# Patient Record
Sex: MALE | Race: BLACK OR AFRICAN AMERICAN | NOT HISPANIC OR LATINO
[De-identification: names, ages, dates, MRNs, and addresses within clinical notes are randomized per-mention and may not be internally consistent; named-entity substitution may affect disease eponyms.]

---

## 2021-12-13 PROBLEM — Z00.00 ENCOUNTER FOR PREVENTIVE HEALTH EXAMINATION: Status: ACTIVE | Noted: 2021-12-13

## 2021-12-16 ENCOUNTER — APPOINTMENT (OUTPATIENT)
Dept: ORTHOPEDIC SURGERY | Facility: CLINIC | Age: 36
End: 2021-12-16
Payer: COMMERCIAL

## 2021-12-16 ENCOUNTER — NON-APPOINTMENT (OUTPATIENT)
Age: 36
End: 2021-12-16

## 2021-12-16 VITALS — HEIGHT: 71 IN | BODY MASS INDEX: 32.9 KG/M2 | WEIGHT: 235 LBS

## 2021-12-16 DIAGNOSIS — S62.616A DISPLACED FRACTURE OF PROXIMAL PHALANX OF RIGHT LITTLE FINGER, INITIAL ENCOUNTER FOR CLOSED FRACTURE: ICD-10-CM

## 2021-12-16 PROCEDURE — 73140 X-RAY EXAM OF FINGER(S): CPT | Mod: F9

## 2021-12-16 PROCEDURE — 29125 APPL SHORT ARM SPLINT STATIC: CPT | Mod: RT

## 2021-12-16 PROCEDURE — 99203 OFFICE O/P NEW LOW 30 MIN: CPT | Mod: 25
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
## 2021-12-16 NOTE — ADDENDUM
[FreeTextEntry1] : I, Velia Resendiz wrote this note acting as a scribe for Dr. Asim Will on Dec 16, 2021.

## 2021-12-16 NOTE — PHYSICAL EXAM
[de-identified] : Patient is WDWN, alert, and in no acute distress. Breathing is unlabored. He is grossly oriented to person, place, and time.\par \par He is accompanied by his wife today.\par \par Right Hand: Little Finger:\par Patient presents in an alumifoam splint which was removed for physical exam.\par No deformity or malrotation of the little finger\par Limited ROM at the MCP joint due to injury and pain as well as immobilization\par Edema present over the MCP joint\par FROM of all other digits\par Sensation is normal to all digits. [de-identified] : AP, lateral and oblique views of the right hand (little finger) were obtained today and revealed a fracture to the proximal phalanx of the right little finger.

## 2021-12-16 NOTE — DISCUSSION/SUMMARY
[FreeTextEntry1] : The underlying pathophysiology was reviewed with the patient. XR films were reviewed with the patient. Discussed at length the nature of the patient’s condition. The right little finger symptoms appear secondary to proximal phalanx fracture.\par \par At this time, he was advised to discontinue use of the alumifoam splint.\par We did discuss options of surgical management with pinning versus nonoperative treatment. At this time, I would recommend nonoperative treatment of the fracture.\par The little finger was kathy taped to the ring finger. He was advised to change the tape daily. He was additionally placed in a fiber glass splint for additional protection that he was advised to solely wear when outdoors but to remove when indoors. \par I reassured him that the swelling will reduce with time and that he should be pumping the hand and actively as well as passively working on ROM of the little finger at all joint levels. \par He was instructed to soak the hand in warm water and Epsom salts.\par \par All questions answered, understanding verbalized. Patient in agreement with plan of care. Follow up in 2 weeks for repeat xrays and reassessment.

## 2021-12-30 ENCOUNTER — APPOINTMENT (OUTPATIENT)
Dept: ORTHOPEDIC SURGERY | Facility: CLINIC | Age: 36
End: 2021-12-30
Payer: COMMERCIAL

## 2022-01-10 ENCOUNTER — APPOINTMENT (OUTPATIENT)
Dept: ORTHOPEDIC SURGERY | Facility: CLINIC | Age: 37
End: 2022-01-10
Payer: COMMERCIAL

## 2022-01-10 VITALS — WEIGHT: 235 LBS | BODY MASS INDEX: 32.9 KG/M2 | HEIGHT: 71 IN

## 2022-01-10 PROCEDURE — 99213 OFFICE O/P EST LOW 20 MIN: CPT

## 2022-01-10 PROCEDURE — 73140 X-RAY EXAM OF FINGER(S): CPT | Mod: F9

## 2022-01-10 NOTE — ADDENDUM
[FreeTextEntry1] : I, Velia Resendiz wrote this note acting as a scribe for Dr. Asim Will on Rod 10, 2022.

## 2022-01-10 NOTE — END OF VISIT
[FreeTextEntry3] : All medical record entries made by the Scribe were at my,  Dr. Asim Will MD., direction and personally dictated by me on 01/10/2022. I have personally reviewed the chart and agree that the record accurately reflects my personal performance of the history, physical exam, assessment and plan.

## 2022-01-10 NOTE — DISCUSSION/SUMMARY
[FreeTextEntry1] : The underlying pathophysiology was reviewed with the patient. XR films were reviewed with the patient. Discussed at length the nature of the patient’s condition. The right little finger symptoms are secondary to proximal phalanx fracture.\par \par He was instructed on continue range of motion exercises of the right little finger both actively and passively. He was encouraged to soak the hand in warm water and Epsom salts to aide in alleviating the stiffness to the finger and help to improve ROM when performing ROM exercises. \par I would recommend he continue with kathy taping of the little finger to the neighboring digit and use the ring finger in helping to move and stretch the little finger. Change the tape once daily.\par Finally, during today's visit, the little finger was wrapped into forced flexion with Coban to promote further flexion of the digit and flexion of the joint capsule. \par \par All questions answered, understanding verbalized. Patient in agreement with plan of care. Follow up in 3 weeks for repeat xrays and reassessment.
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
## 2022-01-10 NOTE — PHYSICAL EXAM
[de-identified] : Patient is WDWN, alert, and in no acute distress. Breathing is unlabored. He is grossly oriented to person, place, and time.\par \par Right Hand: Little Finger:\par No deformity or malrotation of the little finger\par Limited ROM at the MCP joint due to injury and pain as well as immobilization\par Edema present over the MCP joint\par FROM of all other digits\par Sensation is normal to all digits.  [de-identified] : AP, lateral and oblique views of the right hand (little finger) were obtained today and revealed a fracture to the proximal phalanx of the right little finger. There is partial bony healing noted.

## 2022-01-31 ENCOUNTER — APPOINTMENT (OUTPATIENT)
Dept: ORTHOPEDIC SURGERY | Facility: CLINIC | Age: 37
End: 2022-01-31
Payer: COMMERCIAL

## 2022-01-31 ENCOUNTER — TRANSCRIPTION ENCOUNTER (OUTPATIENT)
Age: 37
End: 2022-01-31

## 2022-01-31 PROCEDURE — 73140 X-RAY EXAM OF FINGER(S): CPT | Mod: F9

## 2022-01-31 PROCEDURE — 99213 OFFICE O/P EST LOW 20 MIN: CPT

## 2022-01-31 NOTE — ADDENDUM
[FreeTextEntry1] : I, Velia Resendiz wrote this note acting as a scribe for Dr. Asim Will on Jan 31, 2022.
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
## 2022-01-31 NOTE — END OF VISIT
[FreeTextEntry3] : All medical record entries made by the Scribe were at my,  Dr. Asim Will MD., direction and personally dictated by me on 01/31/2022. I have personally reviewed the chart and agree that the record accurately reflects my personal performance of the history, physical exam, assessment and plan.

## 2022-01-31 NOTE — PHYSICAL EXAM
[de-identified] : Patient is WDWN, alert, and in no acute distress. Breathing is unlabored. He is grossly oriented to person, place, and time.\par \par Right Hand: Little Finger:\par No deformity or malrotation of the little finger\par Limited ROM at the MCP joint due to injury and pain as well as immobilization\par No tenderness to palpation to the MCP joint.\par Edema present over the MCP joint\par FROM of all other digits\par Sensation is normal to all digits.  [de-identified] : AP, lateral and oblique views of the right hand (little finger) were obtained today and revealed a fracture to the proximal phalanx of the right little finger. Interval healing taking place.

## 2022-01-31 NOTE — DISCUSSION/SUMMARY
[FreeTextEntry1] : The underlying pathophysiology was reviewed with the patient. XR films were reviewed with the patient. Discussed at length the nature of the patient’s condition. The right little finger symptoms are secondary to proximal phalanx fracture.\par \par He was instructed on continue range of motion exercises of the right little finger both actively and passively. He was encouraged to soak the hand in warm water and Epsom salts to aide in alleviating the stiffness to the finger and help to improve ROM when performing ROM exercises. \par Given his overall improvement, I do not feel hand therapy is necessary. Patient is in agreement and has agreed to continue with at home exercises.\par \par All questions answered, understanding verbalized. Patient in agreement with plan of care. Follow up in 3 months for repeat xrays.

## 2022-05-02 ENCOUNTER — APPOINTMENT (OUTPATIENT)
Dept: ORTHOPEDIC SURGERY | Facility: CLINIC | Age: 37
End: 2022-05-02
Payer: COMMERCIAL

## 2022-05-02 DIAGNOSIS — S62.616D DISPLACED FRACTURE OF PROXIMAL PHALANX OF RIGHT LITTLE FINGER, SUBSEQUENT ENCOUNTER FOR FRACTURE WITH ROUTINE HEALING: ICD-10-CM

## 2022-05-02 PROCEDURE — 73140 X-RAY EXAM OF FINGER(S): CPT | Mod: F9

## 2022-05-02 PROCEDURE — 99213 OFFICE O/P EST LOW 20 MIN: CPT

## 2022-05-03 NOTE — DISCUSSION/SUMMARY
[FreeTextEntry1] : The underlying pathophysiology was reviewed with the patient. XR films were reviewed with the patient. Discussed at length the nature of the patient’s condition. The right little finger symptoms are secondary to proximal phalanx fracture.\par \par He will advance his activities as tolerated without restrictions at this time. \par \par All questions answered, understanding verbalized. Patient in agreement with plan of care. Follow up as needed.

## 2022-05-03 NOTE — ADDENDUM
[FreeTextEntry1] : I, Velia Resendiz wrote this note acting as a scribe for Dr. Asim Will on May 02, 2022.

## 2022-05-03 NOTE — END OF VISIT
[FreeTextEntry3] : All medical record entries made by the Scribe were at my,  Dr. Asim Will MD., direction and personally dictated by me on 05/02/2022. I have personally reviewed the chart and agree that the record accurately reflects my personal performance of the history, physical exam, assessment and plan.

## 2022-05-03 NOTE — PHYSICAL EXAM
[de-identified] : Patient is WDWN, alert, and in no acute distress. Breathing is unlabored. He is grossly oriented to person, place, and time.\par \par Right Hand: Little Finger:\par No deformity or malrotation of the little finger\par ROM at the MCP joint is full\par No tenderness to palpation to the MCP joint.\par MIld edema present over the MCP joint\par FROM of all other digits\par Sensation is normal to all digits. \par  [de-identified] : AP, lateral and oblique views of the right hand (little finger) were obtained today and revealed a fracture to the proximal phalanx of the right little finger. Fracture is healed.
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
## 2025-07-24 ENCOUNTER — APPOINTMENT (OUTPATIENT)
Dept: CARDIOLOGY | Facility: CLINIC | Age: 40
End: 2025-07-24

## 2025-08-07 ENCOUNTER — NON-APPOINTMENT (OUTPATIENT)
Age: 40
End: 2025-08-07

## 2025-08-07 ENCOUNTER — APPOINTMENT (OUTPATIENT)
Dept: CARDIOLOGY | Facility: CLINIC | Age: 40
End: 2025-08-07
Payer: COMMERCIAL

## 2025-08-07 VITALS
DIASTOLIC BLOOD PRESSURE: 70 MMHG | SYSTOLIC BLOOD PRESSURE: 130 MMHG | WEIGHT: 248 LBS | BODY MASS INDEX: 34.59 KG/M2 | OXYGEN SATURATION: 96 % | HEART RATE: 70 BPM

## 2025-08-07 DIAGNOSIS — Z00.00 ENCOUNTER FOR GENERAL ADULT MEDICAL EXAMINATION W/OUT ABNORMAL FINDINGS: ICD-10-CM

## 2025-08-07 DIAGNOSIS — I10 ESSENTIAL (PRIMARY) HYPERTENSION: ICD-10-CM

## 2025-08-07 DIAGNOSIS — R01.1 CARDIAC MURMUR, UNSPECIFIED: ICD-10-CM

## 2025-08-07 DIAGNOSIS — E78.5 HYPERLIPIDEMIA, UNSPECIFIED: ICD-10-CM

## 2025-08-07 DIAGNOSIS — E66.01 MORBID (SEVERE) OBESITY DUE TO EXCESS CALORIES: ICD-10-CM

## 2025-08-07 DIAGNOSIS — R73.03 PREDIABETES.: ICD-10-CM

## 2025-08-07 PROCEDURE — 99204 OFFICE O/P NEW MOD 45 MIN: CPT | Mod: 25

## 2025-08-07 PROCEDURE — 99401 PREV MED CNSL INDIV APPRX 15: CPT

## 2025-08-07 PROCEDURE — G0537: CPT

## 2025-08-07 PROCEDURE — 93000 ELECTROCARDIOGRAM COMPLETE: CPT

## 2025-08-12 RX ORDER — SEMAGLUTIDE 0.25 MG/.5ML
0.25 INJECTION, SOLUTION SUBCUTANEOUS
Qty: 4 | Refills: 0 | Status: ACTIVE | COMMUNITY
Start: 2025-08-12 | End: 1900-01-01

## 2025-09-08 ENCOUNTER — APPOINTMENT (OUTPATIENT)
Dept: CARDIOLOGY | Facility: CLINIC | Age: 40
End: 2025-09-08
Payer: COMMERCIAL

## 2025-09-08 VITALS — BODY MASS INDEX: 33.47 KG/M2 | WEIGHT: 240 LBS

## 2025-09-08 DIAGNOSIS — R73.03 PREDIABETES.: ICD-10-CM

## 2025-09-08 DIAGNOSIS — I10 ESSENTIAL (PRIMARY) HYPERTENSION: ICD-10-CM

## 2025-09-08 DIAGNOSIS — E78.5 HYPERLIPIDEMIA, UNSPECIFIED: ICD-10-CM

## 2025-09-08 PROCEDURE — 99214 OFFICE O/P EST MOD 30 MIN: CPT | Mod: 95

## 2025-09-08 PROCEDURE — G2211 COMPLEX E/M VISIT ADD ON: CPT | Mod: NC,95

## 2025-09-08 RX ORDER — SEMAGLUTIDE 0.5 MG/.5ML
0.5 INJECTION, SOLUTION SUBCUTANEOUS
Qty: 1 | Refills: 0 | Status: ACTIVE | COMMUNITY
Start: 2025-09-08 | End: 1900-01-01